# Patient Record
Sex: MALE | Race: WHITE | NOT HISPANIC OR LATINO | Employment: OTHER | URBAN - METROPOLITAN AREA
[De-identification: names, ages, dates, MRNs, and addresses within clinical notes are randomized per-mention and may not be internally consistent; named-entity substitution may affect disease eponyms.]

---

## 2024-06-20 ENCOUNTER — HOSPITAL ENCOUNTER (EMERGENCY)
Facility: CLINIC | Age: 74
Discharge: HOME | End: 2024-06-20
Attending: EMERGENCY MEDICINE | Admitting: EMERGENCY MEDICINE
Payer: COMMERCIAL

## 2024-06-20 ENCOUNTER — APPOINTMENT (OUTPATIENT)
Dept: CT IMAGING | Facility: CLINIC | Age: 74
End: 2024-06-20
Attending: EMERGENCY MEDICINE
Payer: COMMERCIAL

## 2024-06-20 ENCOUNTER — APPOINTMENT (OUTPATIENT)
Dept: GENERAL RADIOLOGY | Facility: CLINIC | Age: 74
End: 2024-06-20
Attending: EMERGENCY MEDICINE
Payer: COMMERCIAL

## 2024-06-20 VITALS
WEIGHT: 180 LBS | TEMPERATURE: 97.4 F | HEIGHT: 71 IN | OXYGEN SATURATION: 92 % | HEART RATE: 89 BPM | DIASTOLIC BLOOD PRESSURE: 72 MMHG | BODY MASS INDEX: 25.2 KG/M2 | SYSTOLIC BLOOD PRESSURE: 116 MMHG | RESPIRATION RATE: 16 BRPM

## 2024-06-20 DIAGNOSIS — N17.9 AKI (ACUTE KIDNEY INJURY) (H): ICD-10-CM

## 2024-06-20 DIAGNOSIS — E86.0 MILD DEHYDRATION: ICD-10-CM

## 2024-06-20 DIAGNOSIS — I95.1 ORTHOSTATIC SYNCOPE: ICD-10-CM

## 2024-06-20 DIAGNOSIS — I95.1 ORTHOSTATIC HYPOTENSION: Primary | ICD-10-CM

## 2024-06-20 LAB
ALBUMIN SERPL BCG-MCNC: 4.1 G/DL (ref 3.5–5.2)
ALP SERPL-CCNC: 77 U/L (ref 40–150)
ALT SERPL W P-5'-P-CCNC: 41 U/L (ref 0–70)
ANION GAP SERPL CALCULATED.3IONS-SCNC: 10 MMOL/L (ref 7–15)
AST SERPL W P-5'-P-CCNC: 23 U/L (ref 0–45)
ATRIAL RATE - MUSE: 99 BPM
BASOPHILS # BLD AUTO: 0 10E3/UL (ref 0–0.2)
BASOPHILS NFR BLD AUTO: 1 %
BILIRUB SERPL-MCNC: 0.3 MG/DL
BUN SERPL-MCNC: 17.3 MG/DL (ref 8–23)
CALCIUM SERPL-MCNC: 9.3 MG/DL (ref 8.8–10.2)
CHLORIDE SERPL-SCNC: 103 MMOL/L (ref 98–107)
CREAT SERPL-MCNC: 1.29 MG/DL (ref 0.67–1.17)
DEPRECATED HCO3 PLAS-SCNC: 26 MMOL/L (ref 22–29)
DIASTOLIC BLOOD PRESSURE - MUSE: NORMAL MMHG
EGFRCR SERPLBLD CKD-EPI 2021: 57 ML/MIN/1.73M2
EOSINOPHIL # BLD AUTO: 0.3 10E3/UL (ref 0–0.7)
EOSINOPHIL NFR BLD AUTO: 4 %
ERYTHROCYTE [DISTWIDTH] IN BLOOD BY AUTOMATED COUNT: 13.3 % (ref 10–15)
GLUCOSE SERPL-MCNC: 107 MG/DL (ref 70–99)
HCT VFR BLD AUTO: 41.7 % (ref 40–53)
HGB BLD-MCNC: 14.3 G/DL (ref 13.3–17.7)
IMM GRANULOCYTES # BLD: 0 10E3/UL
IMM GRANULOCYTES NFR BLD: 1 %
INTERPRETATION ECG - MUSE: NORMAL
LYMPHOCYTES # BLD AUTO: 0.8 10E3/UL (ref 0.8–5.3)
LYMPHOCYTES NFR BLD AUTO: 11 %
MAGNESIUM SERPL-MCNC: 2.3 MG/DL (ref 1.7–2.3)
MCH RBC QN AUTO: 32.9 PG (ref 26.5–33)
MCHC RBC AUTO-ENTMCNC: 34.3 G/DL (ref 31.5–36.5)
MCV RBC AUTO: 96 FL (ref 78–100)
MONOCYTES # BLD AUTO: 0.6 10E3/UL (ref 0–1.3)
MONOCYTES NFR BLD AUTO: 8 %
NEUTROPHILS # BLD AUTO: 5.4 10E3/UL (ref 1.6–8.3)
NEUTROPHILS NFR BLD AUTO: 76 %
NRBC # BLD AUTO: 0 10E3/UL
NRBC BLD AUTO-RTO: 0 /100
P AXIS - MUSE: 41 DEGREES
PLATELET # BLD AUTO: 203 10E3/UL (ref 150–450)
POTASSIUM SERPL-SCNC: 4.6 MMOL/L (ref 3.4–5.3)
PR INTERVAL - MUSE: 164 MS
PROT SERPL-MCNC: 7 G/DL (ref 6.4–8.3)
QRS DURATION - MUSE: 94 MS
QT - MUSE: 332 MS
QTC - MUSE: 426 MS
R AXIS - MUSE: -3 DEGREES
RBC # BLD AUTO: 4.35 10E6/UL (ref 4.4–5.9)
SODIUM SERPL-SCNC: 139 MMOL/L (ref 135–145)
SYSTOLIC BLOOD PRESSURE - MUSE: NORMAL MMHG
T AXIS - MUSE: 32 DEGREES
TROPONIN T SERPL HS-MCNC: 19 NG/L
TROPONIN T SERPL HS-MCNC: 22 NG/L
VENTRICULAR RATE- MUSE: 99 BPM
WBC # BLD AUTO: 7.1 10E3/UL (ref 4–11)

## 2024-06-20 PROCEDURE — 36415 COLL VENOUS BLD VENIPUNCTURE: CPT | Performed by: EMERGENCY MEDICINE

## 2024-06-20 PROCEDURE — 84484 ASSAY OF TROPONIN QUANT: CPT | Mod: 91 | Performed by: EMERGENCY MEDICINE

## 2024-06-20 PROCEDURE — 71046 X-RAY EXAM CHEST 2 VIEWS: CPT

## 2024-06-20 PROCEDURE — 85025 COMPLETE CBC W/AUTO DIFF WBC: CPT | Performed by: EMERGENCY MEDICINE

## 2024-06-20 PROCEDURE — 70450 CT HEAD/BRAIN W/O DYE: CPT

## 2024-06-20 PROCEDURE — 83735 ASSAY OF MAGNESIUM: CPT | Performed by: EMERGENCY MEDICINE

## 2024-06-20 PROCEDURE — 258N000003 HC RX IP 258 OP 636: Mod: JZ | Performed by: EMERGENCY MEDICINE

## 2024-06-20 PROCEDURE — 80053 COMPREHEN METABOLIC PANEL: CPT | Performed by: EMERGENCY MEDICINE

## 2024-06-20 PROCEDURE — 93005 ELECTROCARDIOGRAM TRACING: CPT

## 2024-06-20 PROCEDURE — 99285 EMERGENCY DEPT VISIT HI MDM: CPT | Mod: 25

## 2024-06-20 RX ADMIN — SODIUM CHLORIDE, POTASSIUM CHLORIDE, SODIUM LACTATE AND CALCIUM CHLORIDE 1000 ML: 600; 310; 30; 20 INJECTION, SOLUTION INTRAVENOUS at 14:05

## 2024-06-20 ASSESSMENT — ACTIVITIES OF DAILY LIVING (ADL)
ADLS_ACUITY_SCORE: 35

## 2024-06-20 NOTE — ED PROVIDER NOTES
"  Emergency Department Note      History of Present Illness     Chief Complaint  Fall    HPI  Kevin Santamaria is a 74 year old male on Eliis due to past TIA here for evaluation after a fall. Patient states that about one hour ago he fell at his daughter's home. He was walking down a narrow hallway then fell backwards and hit his head on the wall and slid to the ground. Sates that his fall was caused by lightheadedness and feeling unsteady. His daughter states that he appeared unconscious for less than two minutes. She reports generalized tremors and saw his eyes rolled back. States that he went back to normal relatively quickly. He denies any pain since the fall. No chest pain, shortness of breath, nausea, abdominal pain, vision changes, light sensitvity, unilateral numbness or weakness. Patient lives in [place] and is living at his daughter's home temporarily. Patient has neuropathy. States that at baseline he has some trouble balancing when standing up after prolonged sitting. He describes his lightheadedness as feeling \"wobbly.\" Patient states he had a TIA in 1999. He had cardiac surgery for patent foramen ovale. Also has a prothrombin disorder. History of triple bypass surgery.       Independent Historian  None    Review of External Notes  None  Past Medical History   Medical History and Problem List  No past medical history on file.    Medications  No current outpatient medications on file.      Surgical History   No past surgical history on file.  Physical Exam   Patient Vitals for the past 24 hrs:   BP Temp Temp src Pulse Resp SpO2 Height Weight   06/20/24 1500 116/72 -- -- 89 -- -- -- --   06/20/24 1430 114/75 -- -- 88 -- -- -- --   06/20/24 1406 104/74 -- -- 89 -- -- -- --   06/20/24 1355 (!) 82/53 -- -- 95 -- 92 % -- --   06/20/24 1218 111/67 97.4  F (36.3  C) Temporal 98 16 97 % 1.803 m (5' 11\") 81.6 kg (180 lb)     Physical Exam  Constitutional:       General: Not in acute distress.     Appearance: " Normal appearance  HENT:      Head: Normocephalic and atraumatic.   Eyes:     Extraocular Movements: Extraocular movements intact.      Conjunctiva/sclera: Conjunctivae normal.      Pupils: Pupils are equal, round, and reactive to light.   Cardiovascular:     Rate and Rhythm: Normal rate and regular rhythm.   Pulmonary:      Effort: Pulmonary effort is normal.      Breath sounds: Normal breath sounds.   Abdominal:      General: Abdomen is flat. There is no distension.      Palpations: Abdomen is soft.      Tenderness: There is no abdominal tenderness.   Musculoskeletal:      Cervical back: Normal range of motion and neck supple. No rigidity.  No midline cervical spine tenderness to palpation.     Back: No midline T/L-spine tenderness to palpation.  No abrasions.  No contusions.  No off steps.     General: No swelling or deformity.   Skin:     General: Skin is warm and dry.   Neurological:      General: No focal deficit present.     NIHSS: Patient alert and keenly responsive. Able to answer month and age. Able and blink eyes and squeeze hands. No gaze palsy. No visual field deficits. No facial palsy. No arm drift bilaterally. No leg drift bilaterally. No limb ataxia. Able to complete finger nose finger and heel to shin. No sensory deficits. No language deficits/aphasia. No dysarthria. No extinction/inattention.      NIHSS score of 0.        Mental Status: Alert and oriented to person, place, and time.   Psychiatric:         Mood and Affect: Mood normal.         Behavior: Behavior normal.   Diagnostics   Lab Results   Labs Ordered and Resulted from Time of ED Arrival to Time of ED Departure   COMPREHENSIVE METABOLIC PANEL - Abnormal       Result Value    Sodium 139      Potassium 4.6      Carbon Dioxide (CO2) 26      Anion Gap 10      Urea Nitrogen 17.3      Creatinine 1.29 (*)     GFR Estimate 57 (*)     Calcium 9.3      Chloride 103      Glucose 107 (*)     Alkaline Phosphatase 77      AST 23      ALT 41       Protein Total 7.0      Albumin 4.1      Bilirubin Total 0.3     CBC WITH PLATELETS AND DIFFERENTIAL - Abnormal    WBC Count 7.1      RBC Count 4.35 (*)     Hemoglobin 14.3      Hematocrit 41.7      MCV 96      MCH 32.9      MCHC 34.3      RDW 13.3      Platelet Count 203      % Neutrophils 76      % Lymphocytes 11      % Monocytes 8      % Eosinophils 4      % Basophils 1      % Immature Granulocytes 1      NRBCs per 100 WBC 0      Absolute Neutrophils 5.4      Absolute Lymphocytes 0.8      Absolute Monocytes 0.6      Absolute Eosinophils 0.3      Absolute Basophils 0.0      Absolute Immature Granulocytes 0.0      Absolute NRBCs 0.0     TROPONIN T, HIGH SENSITIVITY - Normal    Troponin T, High Sensitivity 19     MAGNESIUM - Normal    Magnesium 2.3     TROPONIN T, HIGH SENSITIVITY - Normal    Troponin T, High Sensitivity 22         Imaging  CT Head w/o Contrast   Final Result   IMPRESSION: No acute intracranial pathology.       JOSÉ MIGUEL MCCABE MD            SYSTEM ID:  [id number]      XR Chest 2 Views   Final Result   IMPRESSION: Cardiac silhouette is within normal limits for size.   Calcifications of the aortic arch. Mild linear opacities at the right   lung base are favored atelectasis. No pleural effusion or discernible   pneumothorax. Median sternotomy wires.      LAQUITA JEAN-BAPTISTE MD            SYSTEM ID:  [id number]          EKG   ECG taken at 1218, ECG read at 1223  Normal sinus rhythm    Rate 99 bpm. NE interval 164 ms. QRS duration 94 ms. QT/QTc 332/426 ms. P-R-T axes 41 -3 32.    Independent Interpretation  See ED course    ED Course    Medications Administered  Medications   lactated ringers BOLUS 1,000 mL (0 mLs Intravenous Stopped 6/20/24 1513)       Procedures  Procedures     Discussion of Management  See ED course    Social Determinants of Health adding to complexity of care  None    ED Course  ED Course as of 06/20/24 1734   Thu Jun 20, 2024   1234 I obtained history and examined the patient as noted  above.    1245 EKG 12-lead, tracing only  Normal sinus rhythm.  Rate of 99.  Normal MD and QRS.  Normal QTc.  No acute ST elevation or depression.  No prior ECG for comparison.   1402 Significant orthostatic dizziness.  Will order IV fluids.   1446 I rechecked and updated the patient.    1549 XR Chest 2 Views  On my independent interpretation of chest x-ray, there is no obvious focal opacity, mediastinal widening, or pneumothorax.   1550 Patient and daughter updated on lab and image findings.  Improvement in orthostatic vitals after IV fluid bolus.  Still some noticeable orthostatic changes, but symptomatic improvement.  Encourage patient to continue oral fluid hydration upon discharge.  Educated on allowing time for her body to equilibrate transitioning from laying to sitting to standing positioning.  Daughter states that she will attempt to purchase a cane or walker outpatient to assist with patient's balance.  Advise close follow-up with primary.  Discussed return precautions.  Answered all questions.  Patient and daughter voiced understanding and agreement with plan.     Medical Decision Making / Diagnosis   CMS Diagnoses: None    MIPS     None    MDM  74-year-old male as described above presents to the emergency department for fall and suspected syncope.  Patient hemodynamically stable at time of evaluation.  Afebrile.  Patient is on Eliquis due to history of clotting disorder and atrial septal defect.  Less than 1 to 2 minutes of suspected loss of consciousness after patient lost his balance, fell against a wall and struck the back of his head.  While daughter reports there was mild generalized tremor, patient has no postictal phase.  Less likely acute seizure.  Some wobbly sensation prior to the fall, but otherwise no other prodromal symptoms.  History of cardiac bypass surgery, but denies any chest pain or shortness of breath.  Nonetheless, will obtain CT head for evaluation for ICH secondary to fall.   Cardiac enzyme testing for evaluation for cardiac cause of potential syncope.  Evaluate for underlying electrolyte derangements.  Orthostatic vital signs.  Discussed care plan with patient and daughter who voiced understanding and agreement with plan.  Answered all questions.  Additional workup and orders as listed in chart.    Please refer to ED course above as part of continuation of MDM for details on the patient's treatment course and any changes or updates in care plan beyond my initial evaluation and MDM creation.    Disposition  The patient was discharged.     ICD-10 Codes:    ICD-10-CM    1. Orthostatic hypotension  I95.1       2. Orthostatic syncope  I95.1       3. FRANCISCO (acute kidney injury) (H24)  N17.9       4. Mild dehydration  E86.0            Discharge Medications  There are no discharge medications for this patient.        Scribe Disclosure:  I, Jo Ann Miller, am serving as a scribe at 2:34 PM on 6/20/2024 to document services personally performed by Caleb Dooley DO based on my observations and the provider's statements to me.        Caleb Dooley DO  06/20/24 [id number]

## 2024-06-20 NOTE — ED TRIAGE NOTES
Patient was walking and somehow landed up on the ground. Patient denies LOC, patient unable to recall if was lightheaded dizziness prior to falling.      Triage Assessment (Adult)       Row Name 06/20/24 1220 06/20/24 1217       Triage Assessment    Airway WDL WDL WDL       Respiratory WDL    Respiratory WDL WDL --    Rhythm/Pattern, Respiratory -- no shortness of breath reported;depth regular;pattern regular;unlabored       Peripheral/Neurovascular WDL    Peripheral Neurovascular WDL X  peripheral neuropathy --       Cognitive/Neuro/Behavioral WDL    Cognitive/Neuro/Behavioral WDL WDL --

## 2024-06-20 NOTE — DISCHARGE INSTRUCTIONS
Please follow-up with your primary care provider and/or specialist regarding your visit to the ER today.    Please return to the emergency department should you experience any of the symptoms we specifically discussed, including but not limited to recurrence or worsening of your symptoms, or development of any new and concerning symptoms such as chest pain, shortness of breath, dizziness, numbness, weakness, or headaches.    Please count to 10 before going from laying, to sitting, to standing, to walking.  Please keep up with oral fluid hydration especially when you are feeling lightheaded and dizzy.